# Patient Record
Sex: MALE | Race: WHITE | NOT HISPANIC OR LATINO | Employment: STUDENT | URBAN - METROPOLITAN AREA
[De-identification: names, ages, dates, MRNs, and addresses within clinical notes are randomized per-mention and may not be internally consistent; named-entity substitution may affect disease eponyms.]

---

## 2023-06-16 LAB
ALANINE AMINOTRANSFERASE (SGPT) (U/L) IN SER/PLAS: 58 U/L (ref 10–52)
ALBUMIN (G/DL) IN SER/PLAS: 4.8 G/DL (ref 3.4–5)
ALKALINE PHOSPHATASE (U/L) IN SER/PLAS: 64 U/L (ref 33–120)
ANION GAP IN SER/PLAS: 14 MMOL/L (ref 10–20)
ASPARTATE AMINOTRANSFERASE (SGOT) (U/L) IN SER/PLAS: 27 U/L (ref 9–39)
BILIRUBIN TOTAL (MG/DL) IN SER/PLAS: 0.9 MG/DL (ref 0–1.2)
CALCIUM (MG/DL) IN SER/PLAS: 9.9 MG/DL (ref 8.6–10.6)
CARBON DIOXIDE, TOTAL (MMOL/L) IN SER/PLAS: 28 MMOL/L (ref 21–32)
CHLORIDE (MMOL/L) IN SER/PLAS: 104 MMOL/L (ref 98–107)
CHOLESTEROL (MG/DL) IN SER/PLAS: 151 MG/DL (ref 0–199)
CHOLESTEROL IN HDL (MG/DL) IN SER/PLAS: 55.2 MG/DL
CHOLESTEROL/HDL RATIO: 2.7
CREATININE (MG/DL) IN SER/PLAS: 1.25 MG/DL (ref 0.5–1.3)
ERYTHROCYTE DISTRIBUTION WIDTH (RATIO) BY AUTOMATED COUNT: 12.5 % (ref 11.5–14.5)
ERYTHROCYTE MEAN CORPUSCULAR HEMOGLOBIN CONCENTRATION (G/DL) BY AUTOMATED: 32.7 G/DL (ref 32–36)
ERYTHROCYTE MEAN CORPUSCULAR VOLUME (FL) BY AUTOMATED COUNT: 92 FL (ref 80–100)
ERYTHROCYTES (10*6/UL) IN BLOOD BY AUTOMATED COUNT: 5.05 X10E12/L (ref 4.5–5.9)
GFR MALE: 82 ML/MIN/1.73M2
GLUCOSE (MG/DL) IN SER/PLAS: 80 MG/DL (ref 74–99)
HEMATOCRIT (%) IN BLOOD BY AUTOMATED COUNT: 46.5 % (ref 41–52)
HEMOGLOBIN (G/DL) IN BLOOD: 15.2 G/DL (ref 13.5–17.5)
LDL: 82 MG/DL (ref 0–119)
LEUKOCYTES (10*3/UL) IN BLOOD BY AUTOMATED COUNT: 6.1 X10E9/L (ref 4.4–11.3)
NRBC (PER 100 WBCS) BY AUTOMATED COUNT: 0 /100 WBC (ref 0–0)
PLATELETS (10*3/UL) IN BLOOD AUTOMATED COUNT: 223 X10E9/L (ref 150–450)
POTASSIUM (MMOL/L) IN SER/PLAS: 4 MMOL/L (ref 3.5–5.3)
PROTEIN TOTAL: 7.4 G/DL (ref 6.4–8.2)
SODIUM (MMOL/L) IN SER/PLAS: 142 MMOL/L (ref 136–145)
TRIGLYCERIDE (MG/DL) IN SER/PLAS: 71 MG/DL (ref 0–149)
UREA NITROGEN (MG/DL) IN SER/PLAS: 18 MG/DL (ref 6–23)
VLDL: 14 MG/DL (ref 0–40)

## 2023-06-17 LAB
CHLAMYDIA TRACH., AMPLIFIED: NEGATIVE
N. GONORRHEA, AMPLIFIED: NEGATIVE

## 2023-07-28 LAB
ALANINE AMINOTRANSFERASE (SGPT) (U/L) IN SER/PLAS: 16 U/L (ref 10–52)
ALBUMIN (G/DL) IN SER/PLAS: 4.9 G/DL (ref 3.4–5)
ALKALINE PHOSPHATASE (U/L) IN SER/PLAS: 61 U/L (ref 33–120)
ANION GAP IN SER/PLAS: 11 MMOL/L (ref 10–20)
ASPARTATE AMINOTRANSFERASE (SGOT) (U/L) IN SER/PLAS: 17 U/L (ref 9–39)
BILIRUBIN TOTAL (MG/DL) IN SER/PLAS: 0.7 MG/DL (ref 0–1.2)
CALCIUM (MG/DL) IN SER/PLAS: 9.6 MG/DL (ref 8.6–10.3)
CARBON DIOXIDE, TOTAL (MMOL/L) IN SER/PLAS: 29 MMOL/L (ref 21–32)
CHLORIDE (MMOL/L) IN SER/PLAS: 105 MMOL/L (ref 98–107)
CREATININE (MG/DL) IN SER/PLAS: 1.17 MG/DL (ref 0.5–1.3)
GFR MALE: 89 ML/MIN/1.73M2
GLUCOSE (MG/DL) IN SER/PLAS: 87 MG/DL (ref 74–99)
POTASSIUM (MMOL/L) IN SER/PLAS: 4 MMOL/L (ref 3.5–5.3)
PROTEIN TOTAL: 7.4 G/DL (ref 6.4–8.2)
SODIUM (MMOL/L) IN SER/PLAS: 141 MMOL/L (ref 136–145)
UREA NITROGEN (MG/DL) IN SER/PLAS: 15 MG/DL (ref 6–23)

## 2023-08-03 LAB
APPEARANCE, URINE: ABNORMAL
BILIRUBIN, URINE: NEGATIVE
BLOOD, URINE: NEGATIVE
COLOR, URINE: YELLOW
GLUCOSE, URINE: NEGATIVE MG/DL
KETONES, URINE: NEGATIVE MG/DL
LEUKOCYTE ESTERASE, URINE: ABNORMAL
MUCUS, URINE: ABNORMAL /LPF
NITRITE, URINE: NEGATIVE
PH, URINE: 5 (ref 5–8)
PROTEIN, URINE: NEGATIVE MG/DL
RBC, URINE: ABNORMAL /HPF (ref 0–5)
SPECIFIC GRAVITY, URINE: 1.03 (ref 1–1.03)
SQUAMOUS EPITHELIAL CELLS, URINE: 1 /HPF
URIC ACID (URATE) CRYSTALS, URINE: ABNORMAL /HPF
UROBILINOGEN, URINE: <2 MG/DL (ref 0–1.9)
WBC, URINE: 125 /HPF (ref 0–5)

## 2023-08-04 LAB
CHLAMYDIA TRACH., AMPLIFIED: POSITIVE
N. GONORRHEA, AMPLIFIED: NEGATIVE
URINE CULTURE: NO GROWTH

## 2024-05-29 ENCOUNTER — PATIENT MESSAGE (OUTPATIENT)
Dept: PRIMARY CARE | Facility: CLINIC | Age: 24
End: 2024-05-29
Payer: COMMERCIAL

## 2024-05-29 DIAGNOSIS — Z11.3 SCREENING FOR STD (SEXUALLY TRANSMITTED DISEASE): Primary | ICD-10-CM

## 2024-05-29 DIAGNOSIS — Z00.00 ANNUAL PHYSICAL EXAM: ICD-10-CM

## 2024-05-30 ENCOUNTER — APPOINTMENT (OUTPATIENT)
Dept: PRIMARY CARE | Facility: CLINIC | Age: 24
End: 2024-05-30
Payer: COMMERCIAL

## 2024-06-29 ENCOUNTER — LAB (OUTPATIENT)
Dept: LAB | Facility: LAB | Age: 24
End: 2024-06-29
Payer: COMMERCIAL

## 2024-06-29 DIAGNOSIS — Z11.3 SCREENING FOR STD (SEXUALLY TRANSMITTED DISEASE): ICD-10-CM

## 2024-06-29 DIAGNOSIS — Z00.00 ANNUAL PHYSICAL EXAM: ICD-10-CM

## 2024-06-29 LAB
ALBUMIN SERPL BCP-MCNC: 4.8 G/DL (ref 3.4–5)
ALP SERPL-CCNC: 80 U/L (ref 33–120)
ALT SERPL W P-5'-P-CCNC: 22 U/L (ref 10–52)
ANION GAP SERPL CALC-SCNC: 12 MMOL/L (ref 10–20)
AST SERPL W P-5'-P-CCNC: 20 U/L (ref 9–39)
BILIRUB SERPL-MCNC: 0.6 MG/DL (ref 0–1.2)
BUN SERPL-MCNC: 20 MG/DL (ref 6–23)
CALCIUM SERPL-MCNC: 9.9 MG/DL (ref 8.6–10.3)
CHLORIDE SERPL-SCNC: 104 MMOL/L (ref 98–107)
CHOLEST SERPL-MCNC: 160 MG/DL (ref 0–199)
CHOLESTEROL/HDL RATIO: 3.1
CO2 SERPL-SCNC: 27 MMOL/L (ref 21–32)
CREAT SERPL-MCNC: 1.29 MG/DL (ref 0.5–1.3)
EGFRCR SERPLBLD CKD-EPI 2021: 78 ML/MIN/1.73M*2
ERYTHROCYTE [DISTWIDTH] IN BLOOD BY AUTOMATED COUNT: 12.4 % (ref 11.5–14.5)
GLUCOSE SERPL-MCNC: 80 MG/DL (ref 74–99)
HCT VFR BLD AUTO: 45.3 % (ref 41–52)
HDLC SERPL-MCNC: 50.9 MG/DL
HGB BLD-MCNC: 15.4 G/DL (ref 13.5–17.5)
HIV 1+2 AB+HIV1 P24 AG SERPL QL IA: NONREACTIVE
LDLC SERPL CALC-MCNC: 97 MG/DL
MCH RBC QN AUTO: 30.1 PG (ref 26–34)
MCHC RBC AUTO-ENTMCNC: 34 G/DL (ref 32–36)
MCV RBC AUTO: 89 FL (ref 80–100)
NON HDL CHOLESTEROL: 109 MG/DL (ref 0–149)
NRBC BLD-RTO: 0 /100 WBCS (ref 0–0)
PLATELET # BLD AUTO: 219 X10*3/UL (ref 150–450)
POTASSIUM SERPL-SCNC: 4.2 MMOL/L (ref 3.5–5.3)
PROT SERPL-MCNC: 7.2 G/DL (ref 6.4–8.2)
RBC # BLD AUTO: 5.11 X10*6/UL (ref 4.5–5.9)
SODIUM SERPL-SCNC: 139 MMOL/L (ref 136–145)
TRIGL SERPL-MCNC: 60 MG/DL (ref 0–149)
VLDL: 12 MG/DL (ref 0–40)
WBC # BLD AUTO: 4.6 X10*3/UL (ref 4.4–11.3)

## 2024-06-29 PROCEDURE — 80061 LIPID PANEL: CPT

## 2024-06-29 PROCEDURE — 87389 HIV-1 AG W/HIV-1&-2 AB AG IA: CPT

## 2024-06-29 PROCEDURE — 87591 N.GONORRHOEAE DNA AMP PROB: CPT

## 2024-06-29 PROCEDURE — 87491 CHLMYD TRACH DNA AMP PROBE: CPT

## 2024-06-29 PROCEDURE — 86780 TREPONEMA PALLIDUM: CPT

## 2024-06-29 PROCEDURE — 36415 COLL VENOUS BLD VENIPUNCTURE: CPT

## 2024-06-29 PROCEDURE — 85027 COMPLETE CBC AUTOMATED: CPT

## 2024-06-29 PROCEDURE — 80053 COMPREHEN METABOLIC PANEL: CPT

## 2024-06-30 LAB
C TRACH RRNA SPEC QL NAA+PROBE: NEGATIVE
N GONORRHOEA DNA SPEC QL PROBE+SIG AMP: NEGATIVE
TREPONEMA PALLIDUM IGG+IGM AB [PRESENCE] IN SERUM OR PLASMA BY IMMUNOASSAY: NONREACTIVE

## 2024-06-30 ASSESSMENT — PROMIS GLOBAL HEALTH SCALE
RATE_QUALITY_OF_LIFE: VERY GOOD
RATE_MENTAL_HEALTH: VERY GOOD
RATE_AVERAGE_FATIGUE: MILD
CARRYOUT_PHYSICAL_ACTIVITIES: COMPLETELY
CARRYOUT_SOCIAL_ACTIVITIES: FAIR
RATE_AVERAGE_PAIN: 0
RATE_PHYSICAL_HEALTH: VERY GOOD
RATE_SOCIAL_SATISFACTION: GOOD
EMOTIONAL_PROBLEMS: SOMETIMES
RATE_GENERAL_HEALTH: VERY GOOD

## 2024-07-01 ENCOUNTER — APPOINTMENT (OUTPATIENT)
Dept: PRIMARY CARE | Facility: CLINIC | Age: 24
End: 2024-07-01
Payer: COMMERCIAL

## 2024-07-01 VITALS
SYSTOLIC BLOOD PRESSURE: 128 MMHG | HEIGHT: 70 IN | BODY MASS INDEX: 20.04 KG/M2 | WEIGHT: 140 LBS | HEART RATE: 86 BPM | DIASTOLIC BLOOD PRESSURE: 78 MMHG | OXYGEN SATURATION: 98 %

## 2024-07-01 DIAGNOSIS — M25.561 ACUTE PAIN OF RIGHT KNEE: ICD-10-CM

## 2024-07-01 DIAGNOSIS — Z00.00 WELL ADULT EXAM: Primary | ICD-10-CM

## 2024-07-01 PROCEDURE — 99395 PREV VISIT EST AGE 18-39: CPT | Performed by: INTERNAL MEDICINE

## 2024-07-01 PROCEDURE — 1036F TOBACCO NON-USER: CPT | Performed by: INTERNAL MEDICINE

## 2024-07-01 ASSESSMENT — PATIENT HEALTH QUESTIONNAIRE - PHQ9
SUM OF ALL RESPONSES TO PHQ9 QUESTIONS 1 & 2: 0
2. FEELING DOWN, DEPRESSED OR HOPELESS: NOT AT ALL
1. LITTLE INTEREST OR PLEASURE IN DOING THINGS: NOT AT ALL

## 2024-07-01 NOTE — PROGRESS NOTES
"Subjective   Patient ID: Jared Velasquez is a 24 y.o. male who presents for Annual Exam.    HPI   While playing baseball about 3 weeks ago, tweaked R knee while running. Described almost as overextending knee. Was constant dull pain in back of knee for a few days then went away. After doing squats in the gym the pain came back.  No swelling. No buckling.    Exercising regularly.  Diet is healthy    Discussed recent labs, all ok.    Review of Systems    Objective   /78 (BP Location: Right arm, Patient Position: Sitting)   Pulse 86   Ht 1.778 m (5' 10\")   Wt 63.5 kg (140 lb)   SpO2 98%   BMI 20.09 kg/m²     Physical Exam  Constitutional:       Appearance: Normal appearance.   HENT:      Right Ear: Tympanic membrane and ear canal normal.      Left Ear: Tympanic membrane and ear canal normal.      Mouth/Throat:      Mouth: Mucous membranes are moist.   Eyes:      Extraocular Movements: Extraocular movements intact.      Pupils: Pupils are equal, round, and reactive to light.   Cardiovascular:      Rate and Rhythm: Normal rate and regular rhythm.      Heart sounds: No murmur heard.  Pulmonary:      Effort: Pulmonary effort is normal.      Breath sounds: Normal breath sounds.   Abdominal:      General: Bowel sounds are normal.      Palpations: Abdomen is soft.      Tenderness: There is no abdominal tenderness.   Musculoskeletal:         General: No deformity.      Cervical back: Neck supple.      Right lower leg: No edema.      Left lower leg: No edema.      Comments: R knee full ROM but with pain on full extension, no effusion, nontender, no laxity   Skin:     Findings: No lesion or rash.   Neurological:      Mental Status: He is alert.      Cranial Nerves: No cranial nerve deficit.      Motor: No weakness.      Gait: Gait normal.         Assessment/Plan   Problem List Items Addressed This Visit             ICD-10-CM    Well adult exam - Primary Z00.00    Acute pain of right knee M25.561    Relevant Orders    " Referral to Physical Therapy          R knee pain, acute - appears to be hyperextension injury. Will start PT. Call or return if no better after 6 weeks.    Immunizations UTD. Tdap 2019.  Colon cancer screening - discussed, mother with precancerous polyps, will plan to screen patient at age 38.    F/u 1 year cpe

## 2025-03-16 ENCOUNTER — PATIENT MESSAGE (OUTPATIENT)
Dept: PRIMARY CARE | Facility: CLINIC | Age: 25
End: 2025-03-16
Payer: COMMERCIAL

## 2025-07-08 ENCOUNTER — OFFICE VISIT (OUTPATIENT)
Facility: CLINIC | Age: 25
End: 2025-07-08
Payer: COMMERCIAL

## 2025-07-08 VITALS — BODY MASS INDEX: 18.47 KG/M2 | HEART RATE: 75 BPM | HEIGHT: 70 IN | WEIGHT: 129 LBS

## 2025-07-08 DIAGNOSIS — R19.4 CHANGE IN BOWEL HABIT: Primary | ICD-10-CM

## 2025-07-08 DIAGNOSIS — Z12.11 SPECIAL SCREENING FOR MALIGNANT NEOPLASMS, COLON: ICD-10-CM

## 2025-07-08 DIAGNOSIS — K62.5 RECTAL BLEEDING: ICD-10-CM

## 2025-07-08 PROCEDURE — 99204 OFFICE O/P NEW MOD 45 MIN: CPT | Performed by: INTERNAL MEDICINE

## 2025-07-08 PROCEDURE — 1036F TOBACCO NON-USER: CPT | Performed by: INTERNAL MEDICINE

## 2025-07-08 PROCEDURE — 3008F BODY MASS INDEX DOCD: CPT | Performed by: INTERNAL MEDICINE

## 2025-07-08 RX ORDER — SODIUM, POTASSIUM,MAG SULFATES 17.5-3.13G
SOLUTION, RECONSTITUTED, ORAL ORAL
Qty: 354 ML | Refills: 0 | Status: SHIPPED | OUTPATIENT
Start: 2025-07-08

## 2025-07-08 NOTE — PROGRESS NOTES
Primary Care Provider: Ralph Olivares MD  Referring Provider: No ref. provider found  My final recommendations will be communicated back to the referring physician and/or primary care provider via shared medical records or via US mail.    Chief Complaint (Reason for visit):   Jared Velasquez is a 25 y.o. male who presents for new onset constipation    ASSESSMENT AND PLAN     Assessment & Plan  Change in bowel habit  Patient has new onset constipation  No apparent reason can be identified from history    -I will do a basic blood work.  Last blood work was 1 year ago  -Patient's mother has history of advanced adenomas.   -I discussed the utility of a colonoscopy for diagnosis.  Patient wants to proceed with her colonoscopy and will help arrange follow-up    Orders:    CBC; Future    Comprehensive Metabolic Panel; Future    TSH with reflex to Free T4 if abnormal; Future    Colonoscopy Diagnostic; Future    Rectal bleeding    Orders:    Colonoscopy Diagnostic; Future      I discussed the risks, benefits and alternative(s) of the procedure(s) with the patient. Pt verbalizes understanding and is willing to proceed. All questions were answered.    Pola Thorne MD, MS    SUBJECTIVE   HPI: History obtained from patient    25-year-old male who comes to see me for change in bowel habits    Until 3 weeks ago, patient was asymptomatic  He used to move his bowels once a week    For the past 3 weeks,  Patient has been having intermittent constipation.  He has not been able to move his bowels at all.  It is associated with abdominal pain for 1 week  He started taking some fiber and some laxative like MiraLAX which helped move his bowels.  As soon as he stopped it, his symptoms have recurred    Patient also notices some red flecks, possibly blood in the stool on the toilet paper    F/h of advanced adenomas in his mother    Medical History[1]    Surgical History[2]    Current Medications[3]    Allergies[4]  OBJECTIVE   PHYSICAL  "EXAM:  Pulse 75   Ht 1.778 m (5' 10\")   Wt 58.5 kg (129 lb)   BMI 18.51 kg/m²      LABS/IMAGING/SCOPES  Lab Results   Component Value Date    WBC 4.6 06/29/2024    HGB 15.4 06/29/2024    HCT 45.3 06/29/2024    MCV 89 06/29/2024     06/29/2024     Lab Results   Component Value Date    GLUCOSE 80 06/29/2024    CALCIUM 9.9 06/29/2024     06/29/2024    K 4.2 06/29/2024    CO2 27 06/29/2024     06/29/2024    BUN 20 06/29/2024    CREATININE 1.29 06/29/2024     Lab Results   Component Value Date    ALT 22 06/29/2024    AST 20 06/29/2024    ALKPHOS 80 06/29/2024    BILITOT 0.6 06/29/2024           Pola Thorne MD, MS         [1]   Past Medical History:  Diagnosis Date    Acute serous otitis media, left ear 08/14/2013    Acute serous otitis media of left ear    ADHD (attention deficit hyperactivity disorder)     Anxiety     Hemorrhage of anus and rectum 04/01/2022    BRBPR (bright red blood per rectum)    Other amnesia 06/02/2020    Memory disturbance    Other injury of unspecified body region, initial encounter 09/23/2021    Bruising    Pain in left shoulder 04/16/2021    Chronic left shoulder pain    Personal history of other endocrine, nutritional and metabolic disease 06/03/2020    History of vitamin D deficiency    Plantar wart 08/14/2013    Plantar warts    Strain of adductor muscle, fascia and tendon of unspecified thigh, initial encounter 06/30/2020    Groin strain   [2] No past surgical history on file.  [3]   No current outpatient medications on file.     No current facility-administered medications for this visit.   [4] No Known Allergies    "

## 2025-07-08 NOTE — LETTER
July 8, 2025     Ralph Olivares MD  [address]  [facility] [address]    Patient: Jared Velasquez   YOB: 2000   Date of Visit: 7/8/2025       Dear Dr. Ralph Olivares MD:    Thank you for referring Jared Velasquez to me for evaluation. Below are my notes for this consultation.  If you have questions, please do not hesitate to call me. I look forward to following your patient along with you.       Sincerely,     Pola Thorne MD, MS      CC: No Recipients  ______________________________________________________________________________________    Primary Care Provider: Ralph Olivares MD  Referring Provider: No ref. provider found  My final recommendations will be communicated back to the referring physician and/or primary care provider via shared medical records or via US mail.    Chief Complaint (Reason for visit):   Jared Velasquez is a 25 y.o. male who presents for new onset constipation    ASSESSMENT AND PLAN     Assessment & Plan  Change in bowel habit  Patient has new onset constipation  No apparent reason can be identified from history    -I will do a basic blood work.  Last blood work was 1 year ago  -Patient's mother has history of advanced adenomas.   -I discussed the utility of a colonoscopy for diagnosis.  Patient wants to proceed with her colonoscopy and will help arrange follow-up    Orders:  •  CBC; Future  •  Comprehensive Metabolic Panel; Future  •  TSH with reflex to Free T4 if abnormal; Future  •  Colonoscopy Diagnostic; Future    Rectal bleeding    Orders:  •  Colonoscopy Diagnostic; Future      I discussed the risks, benefits and alternative(s) of the procedure(s) with the patient. Pt verbalizes understanding and is willing to proceed. All questions were answered.    Pola Thorne MD, MS    SUBJECTIVE   HPI: History obtained from patient    25-year-old male who comes to see me for change in bowel habits    Until 3 weeks ago, patient was asymptomatic  He used to  "move his bowels once a week    For the past 3 weeks,  Patient has been having intermittent constipation.  He has not been able to move his bowels at all.  It is associated with abdominal pain for 1 week  He started taking some fiber and some laxative like MiraLAX which helped move his bowels.  As soon as he stopped it, his symptoms have recurred    Patient also notices some red flecks, possibly blood in the stool on the toilet paper    F/h of advanced adenomas in his mother    Medical History[1]    Surgical History[2]    Current Medications[3]    Allergies[4]  OBJECTIVE   PHYSICAL EXAM:  Pulse 75   Ht 1.778 m (5' 10\")   Wt 58.5 kg (129 lb)   BMI 18.51 kg/m²      LABS/IMAGING/SCOPES  Lab Results   Component Value Date    WBC 4.6 06/29/2024    HGB 15.4 06/29/2024    HCT 45.3 06/29/2024    MCV 89 06/29/2024     06/29/2024     Lab Results   Component Value Date    GLUCOSE 80 06/29/2024    CALCIUM 9.9 06/29/2024     06/29/2024    K 4.2 06/29/2024    CO2 27 06/29/2024     06/29/2024    BUN 20 06/29/2024    CREATININE 1.29 06/29/2024     Lab Results   Component Value Date    ALT 22 06/29/2024    AST 20 06/29/2024    ALKPHOS 80 06/29/2024    BILITOT 0.6 06/29/2024           Pola Thorne MD, MS           [1]  Past Medical History:  Diagnosis Date   • Acute serous otitis media, left ear 08/14/2013    Acute serous otitis media of left ear   • ADHD (attention deficit hyperactivity disorder)    • Anxiety    • Hemorrhage of anus and rectum 04/01/2022    BRBPR (bright red blood per rectum)   • Other amnesia 06/02/2020    Memory disturbance   • Other injury of unspecified body region, initial encounter 09/23/2021    Bruising   • Pain in left shoulder 04/16/2021    Chronic left shoulder pain   • Personal history of other endocrine, nutritional and metabolic disease 06/03/2020    History of vitamin D deficiency   • Plantar wart 08/14/2013    Plantar warts   • Strain of adductor muscle, fascia and tendon of " unspecified thigh, initial encounter 06/30/2020    Groin strain   [2]  No past surgical history on file.  [3]  No current outpatient medications on file.     No current facility-administered medications for this visit.   [4]  No Known Allergies

## 2025-07-11 PROCEDURE — RXMED WILLOW AMBULATORY MEDICATION CHARGE

## 2025-07-14 ENCOUNTER — PHARMACY VISIT (OUTPATIENT)
Dept: PHARMACY | Facility: CLINIC | Age: 25
End: 2025-07-14
Payer: COMMERCIAL

## 2025-07-16 ENCOUNTER — APPOINTMENT (OUTPATIENT)
Dept: GASTROENTEROLOGY | Facility: EXTERNAL LOCATION | Age: 25
End: 2025-07-16
Payer: COMMERCIAL

## 2025-07-16 DIAGNOSIS — R19.4 CHANGE IN BOWEL HABIT: ICD-10-CM

## 2025-07-16 DIAGNOSIS — K62.5 RECTAL BLEEDING: ICD-10-CM

## 2025-07-16 DIAGNOSIS — Z83.719 FAMILY HISTORY OF COLON POLYPS, UNSPECIFIED: Primary | ICD-10-CM

## 2025-07-16 PROCEDURE — 45378 DIAGNOSTIC COLONOSCOPY: CPT | Performed by: INTERNAL MEDICINE

## 2025-09-04 ENCOUNTER — APPOINTMENT (OUTPATIENT)
Dept: PRIMARY CARE | Facility: CLINIC | Age: 25
End: 2025-09-04
Payer: COMMERCIAL

## 2025-09-04 PROBLEM — G44.209 TENSION HEADACHE: Status: ACTIVE | Noted: 2025-09-04

## 2025-09-04 ASSESSMENT — PATIENT HEALTH QUESTIONNAIRE - PHQ9
SUM OF ALL RESPONSES TO PHQ9 QUESTIONS 1 AND 2: 0
1. LITTLE INTEREST OR PLEASURE IN DOING THINGS: NOT AT ALL
2. FEELING DOWN, DEPRESSED OR HOPELESS: NOT AT ALL

## 2025-09-04 ASSESSMENT — COLUMBIA-SUICIDE SEVERITY RATING SCALE - C-SSRS
1. IN THE PAST MONTH, HAVE YOU WISHED YOU WERE DEAD OR WISHED YOU COULD GO TO SLEEP AND NOT WAKE UP?: NO
6. HAVE YOU EVER DONE ANYTHING, STARTED TO DO ANYTHING, OR PREPARED TO DO ANYTHING TO END YOUR LIFE?: NO
2. HAVE YOU ACTUALLY HAD ANY THOUGHTS OF KILLING YOURSELF?: NO

## 2026-09-14 ENCOUNTER — APPOINTMENT (OUTPATIENT)
Dept: PRIMARY CARE | Facility: CLINIC | Age: 26
End: 2026-09-14
Payer: COMMERCIAL